# Patient Record
Sex: MALE | Race: WHITE | NOT HISPANIC OR LATINO | URBAN - METROPOLITAN AREA
[De-identification: names, ages, dates, MRNs, and addresses within clinical notes are randomized per-mention and may not be internally consistent; named-entity substitution may affect disease eponyms.]

---

## 2017-06-08 NOTE — PATIENT DISCUSSION
CATARACT, OU - VISUALLY SIGNIFICANT. SCHEDULE PHACO WITH IOL OD FIRST THEN OS IF VISUAL SYMPTOMS PERSIST. GLASSES RX GIVEN TO FILL IF DESIRES IN THE EVENT PATIENT DOES NOT PROCEED WITH SURGERY.

## 2017-06-08 NOTE — PATIENT DISCUSSION
Surgery Counseling:  I have discussed the option of glasses versus cataract surgery versus following, It was explained that when vision no longer meets the patient's visual needs and a new prescription for glasses is not likely to improve the patient's visual symptoms, the option of cataract surgery is a reasonable next step. It was explained that there is no guarantee that removing the cataract will improve their visual symptoms; however, it is believed that the cataract is contributing to the patient's visual impairment and surgery may noticeably improve both the visual and functional status of the patient. After this discussion, the patient desires to proceed with cataract surgery with implantation of an intraocular lens to improve their vision for watching TV and to reduce glare.

## 2017-06-27 NOTE — PATIENT DISCUSSION
New Prescription: Prolensa (bromfenac): drops: 0.07% 1 drop every morning as directed into right eye 06-

## 2017-06-27 NOTE — PATIENT DISCUSSION
New Prescription: Besivance (besifloxacin): drops,suspension: 0.6% 1 drop three times a day as directed into right eye 06-

## 2017-06-27 NOTE — PATIENT DISCUSSION
New Prescription: Pred Forte (prednisolone acetate): drops,suspension: 1% 1 drop three times a day as directed into right eye 06-

## 2017-06-27 NOTE — PATIENT DISCUSSION
-Refractive Error Counseling (Declined): The patient has declined the option to have their refractive error corrected at the time of cataract surgery. It was explained to the patient that there is a high probability that they will need glasses for both distance and near vision. It was also explained to the patient that the decision to not have their refractive error corrected at the time of cataract surgery is a matter of convenience, not quality of vision.

## 2017-06-27 NOTE — PATIENT DISCUSSION
-REFRACTIVE ERROR, W/ASTIGMATISM:  PATIENT DECLINES HAVING THEIR REFRACTIVE ERROR SURGICALLY CORRECTED AND UNDERSTANDS THEY MAY STILL NEED SPEC RX FOR DISTANCE AND NEAR VISION.

## 2017-07-10 NOTE — PATIENT DISCUSSION
*Pre-Op 2nd Eye Counseling: The patient has noticed an improvement in their visual symptoms in the operative eye. The patient complains of decreased vision in the fellow eye when reading. It was explained to the patient that the decision to proceed with cataract surgery in the fellow eye is entirely a separate decision from the surgical eye. All of the same risks, benefits and alternatives ere reviewed with the patient again. The patient does feel the vision in the non-operative eye is limiting their daily activities and elects to proceed with surgery in the cataract eye.

## 2017-07-10 NOTE — PATIENT DISCUSSION
taper as directed in right eye. Start immediately after surgery in left eye and continue as directed.

## 2017-07-10 NOTE — PATIENT DISCUSSION
Continue: Pred Forte (prednisolone acetate): drops,suspension: 1% 1 drop three times a day as directed into right eye 06-

## 2017-07-10 NOTE — PATIENT DISCUSSION
continue as directed in right eye and stop on 07/13/2017. Start two days prior to surgery in left eye and continue as directed.

## 2017-07-10 NOTE — PATIENT DISCUSSION
*S/P PC IOL, OD : DOING WELL. CONTINUE TO TAPER DROPS AS DIRECTED. OK TO PROCEED WITH THE 2ND EYE CATARACT SURGERY AS SCHEDULED.

## 2017-07-10 NOTE — PATIENT DISCUSSION
Continue: Besivance (besifloxacin): drops,suspension: 0.6% 1 drop three times a day as directed into right eye 06-

## 2017-07-26 NOTE — PATIENT DISCUSSION
S/P PC IOL, OS: GOOD POST OP RESULT. STOP ANTIBIOTIC DROP IN ONE WEEK. CONTINUE OTHER DROPS AS DIRECTED UNTIL FURTHER INSTRUCTION. RETURN FOR FOLLOW-UP IN 2 WEEKS.

## 2017-07-26 NOTE — PATIENT DISCUSSION
continue as directed in right eye. Start 2 days prior to surgery in left eye and continue as directed.

## 2017-08-16 NOTE — PATIENT DISCUSSION
S/P PE IOL,OU:  DOING WELL, BUT HAS PERSISTENT ASYMPTOMATIC POSTOPERATIVE IRITIS OU. ADVISED TO CONTINUE WITH LAST WEEK OF DROPS AS SCHEDULED OS, BUT TO BEGIN DUREZOL QID OU TODAY FOR 5 DAYS, THEN REDUCE TO BID OU X 5 DAYS, THEN QD OU X 5 DAYS, THEN STOP (SAMPLE PROVIDED). SPECS RX OFFERED. RX ARC IN SPECS TO MINIMIZE GLARE. RETURN FOR FOLLOW-UP AS SCHEDULED OR SOONER IF HE NOTICES ANY SYMPTOMS OF PAIN, REDNESS OR PHOTOPHOBIA.

## 2017-08-16 NOTE — PATIENT DISCUSSION
Post-Op Instructions OS: Pred Forte (Prednisolone) reduce to 1 time per day for 1 week, then discontinue. Prolensa (Bromfenac) 1 time per day for 1 week, then discontinue. *Begin Durezol taper as prescribed OU.

## 2018-06-14 NOTE — PATIENT DISCUSSION
*PCF OU; NOT  VISUALLY SIGNIFICANT AND NOT BOTHERSOME TO PATIENT AT THIS TIME. CONTINUE TO FOLLOW FOR NOW. OFFER SPEC RX UPDATE.

## 2018-07-02 ENCOUNTER — IMPORTED ENCOUNTER (OUTPATIENT)
Dept: URBAN - METROPOLITAN AREA CLINIC 50 | Facility: CLINIC | Age: 83
End: 2018-07-02

## 2019-07-01 ENCOUNTER — IMPORTED ENCOUNTER (OUTPATIENT)
Dept: URBAN - METROPOLITAN AREA CLINIC 50 | Facility: CLINIC | Age: 84
End: 2019-07-01

## 2019-07-01 NOTE — PATIENT DISCUSSION
"""recommend patient have a motility check with optom to verify prism and help with not having to ""

## 2019-08-02 ENCOUNTER — IMPORTED ENCOUNTER (OUTPATIENT)
Dept: URBAN - METROPOLITAN AREA CLINIC 50 | Facility: CLINIC | Age: 84
End: 2019-08-02

## 2021-04-17 ASSESSMENT — VISUAL ACUITY
OD_OTHER: >20/400.
OD_CC: 20/40-1/+2
OS_BAT: 20/30
OS_PH: 20/30+2
OS_OTHER: 20/30. 20/40.
OS_OTHER: 20/60. 20/70.
OD_BAT: >20/400
OD_BAT: 20/50
OD_OTHER: 20/50. 20/70.
OS_CC: J1+
OS_CC: J1+@ 12 IN
OD_CC: J1+@ 12 IN
OS_CC: J1+-
OS_CC: 20/30-1
OD_CC: 20/40
OS_CC: 20/30-2
OD_PH: @ 12 IN
OS_PH: @ 12 IN
OD_CC: 20/40-
OD_PH: 20/30+2
OD_CC: J1+-
OS_CC: 20/25-
OD_PH: 20/30-2
OD_CC: J1+
OS_BAT: 20/60
OD_PH: 20/30

## 2021-04-17 ASSESSMENT — TONOMETRY
OS_IOP_MMHG: 14
OD_IOP_MMHG: 14
OD_IOP_MMHG: 14
OS_IOP_MMHG: 14

## 2021-07-19 ENCOUNTER — PREPPED CHART (OUTPATIENT)
Dept: URBAN - METROPOLITAN AREA CLINIC 49 | Facility: CLINIC | Age: 86
End: 2021-07-19

## 2021-07-19 ENCOUNTER — COMPREHENSIVE EXAM (OUTPATIENT)
Dept: URBAN - METROPOLITAN AREA CLINIC 49 | Facility: CLINIC | Age: 86
End: 2021-07-19

## 2021-07-19 DIAGNOSIS — H43.393: ICD-10-CM

## 2021-07-19 DIAGNOSIS — H25.13: ICD-10-CM

## 2021-07-19 PROCEDURE — 92015 DETERMINE REFRACTIVE STATE: CPT

## 2021-07-19 PROCEDURE — 92014 COMPRE OPH EXAM EST PT 1/>: CPT

## 2021-07-19 PROCEDURE — 92134 CPTRZ OPH DX IMG PST SGM RTA: CPT

## 2021-07-19 ASSESSMENT — TONOMETRY
OS_IOP_MMHG: 14
OD_IOP_MMHG: 14

## 2021-07-19 ASSESSMENT — VISUAL ACUITY
OS_CC: 20/25
OD_CC: J5-1
OS_GLARE: 20/25
OS_GLARE: 20/50
OU_CC: J1
OD_PH: 20/30-2
OU_CC: 20/25
OD_GLARE: 20/200
OD_GLARE: 20/60
OS_CC: J1

## 2022-07-18 ENCOUNTER — COMPREHENSIVE EXAM (OUTPATIENT)
Dept: URBAN - METROPOLITAN AREA CLINIC 49 | Facility: CLINIC | Age: 87
End: 2022-07-18

## 2022-07-18 DIAGNOSIS — H25.13: ICD-10-CM

## 2022-07-18 DIAGNOSIS — H43.813: ICD-10-CM

## 2022-07-18 PROCEDURE — 92015 DETERMINE REFRACTIVE STATE: CPT

## 2022-07-18 PROCEDURE — 92014 COMPRE OPH EXAM EST PT 1/>: CPT

## 2022-07-18 PROCEDURE — 92134 CPTRZ OPH DX IMG PST SGM RTA: CPT

## 2022-07-18 ASSESSMENT — TONOMETRY
OD_IOP_MMHG: 15
OS_IOP_MMHG: 14

## 2022-07-18 ASSESSMENT — VISUAL ACUITY
OS_CC: 20/30-1
OS_GLARE: 20/50
OU_CC: J1+@17"
OS_GLARE: 20/30
OD_GLARE: 20/100
OD_GLARE: 20/70
OD_CC: 20/30-1

## 2023-08-09 ENCOUNTER — COMPREHENSIVE EXAM (OUTPATIENT)
Dept: URBAN - METROPOLITAN AREA CLINIC 49 | Facility: LOCATION | Age: 88
End: 2023-08-09

## 2023-08-09 DIAGNOSIS — H25.13: ICD-10-CM

## 2023-08-09 DIAGNOSIS — H43.813: ICD-10-CM

## 2023-08-09 PROCEDURE — 92014 COMPRE OPH EXAM EST PT 1/>: CPT

## 2023-08-09 ASSESSMENT — VISUAL ACUITY
OS_GLARE: 20/40
OD_CC: 20/40+2
OS_GLARE: 20/40
OS_CC: 20/30-
OS_PH: 20/30+2
OD_GLARE: 20/40
OD_GLARE: 20/40

## 2023-08-09 ASSESSMENT — TONOMETRY
OD_IOP_MMHG: 15
OS_IOP_MMHG: 15